# Patient Record
(demographics unavailable — no encounter records)

---

## 2025-02-27 NOTE — ASSESSMENT
[FreeTextEntry1] : Assessment: 53M here for prostate cancer screening  I explained that the primary screening modality recommended for the early detection of prostate cancer is a PSA blood test. At present, there is insufficient evidence to support adding ANDREW to PSA-based prostate cancer screening.  We discussed the controversy surrounding PSA testing and prostate cancer screening. We reviewed issues regarding over-screening, over-diagnosis, and overtreatment. The AUA recommends shared decision making with people for whom prostate cancer screening would be appropriate. According to current guidelines, clinicians may begin prostate cancer screening and offer a baseline PSA test to people between ages 45 to 50 years and should offer screening beginning at age 40-45 for people at increased risk of developing cancer based on risk factors (such as black ancestry, germline mutations, and/or strong family history of prostate cancer). This represents a paradigm change from the previously supported recommendations to begin screening at age 55, and this earlier initiation of screening is supported by observational studies demonstrating a prognostic value of obtaining a baseline PSA in early mid-life.  Specifically, baseline PSA has been shown to be a stronger predictor of prostate cancer risk than race or family history of cancer. Regular screening should be performed every 2 to 4 years for men between 50 and 69 years of age.   With regards to digital rectal exam, the data to support its utility and prostate cancer screening is low. In men with PSA less than 4 ng/mL, the risk of an abnormal ANDREW is only approximately 3%, though it is worth noting that the addition of a ANDREW has been found to improve the detection of higher-grade disease. In contrast to screening, the use of ANDREW at a subsequent encounter may be of value and its greatest utility has been shown to be in the work-up of a PSA greater than equal to 2 ng/ml.  Plan: -PSA

## 2025-02-27 NOTE — HISTORY OF PRESENT ILLNESS
[FreeTextEntry1] : Name EMILY LING MRN [id number]  May  2 1971 ------------------------------------------------------------------------------------------------------------------------------------------- Date of First Visit: XXXXXX Referring Provider/PCP: XXXXXX / XXXXXX ------------------------------------------------------------------------------------------------------------------------------------------- CC: prostate cancer screening   History of Present Illness: EMILY LING is a 53 year old male [facility] XXXXX who presents for XXXXXX